# Patient Record
Sex: MALE | Race: OTHER | HISPANIC OR LATINO | URBAN - METROPOLITAN AREA
[De-identification: names, ages, dates, MRNs, and addresses within clinical notes are randomized per-mention and may not be internally consistent; named-entity substitution may affect disease eponyms.]

---

## 2023-10-02 ENCOUNTER — EMERGENCY (EMERGENCY)
Facility: HOSPITAL | Age: 63
LOS: 1 days | Discharge: HOME | End: 2023-10-02
Attending: EMERGENCY MEDICINE | Admitting: EMERGENCY MEDICINE
Payer: COMMERCIAL

## 2023-10-02 VITALS
DIASTOLIC BLOOD PRESSURE: 80 MMHG | HEART RATE: 97 BPM | RESPIRATION RATE: 16 BRPM | TEMPERATURE: 98 F | SYSTOLIC BLOOD PRESSURE: 122 MMHG | OXYGEN SATURATION: 100 %

## 2023-10-02 VITALS
SYSTOLIC BLOOD PRESSURE: 143 MMHG | TEMPERATURE: 98 F | RESPIRATION RATE: 17 BRPM | HEART RATE: 99 BPM | OXYGEN SATURATION: 100 % | DIASTOLIC BLOOD PRESSURE: 75 MMHG

## 2023-10-02 PROCEDURE — G1004: CPT

## 2023-10-02 PROCEDURE — 99284 EMERGENCY DEPT VISIT MOD MDM: CPT

## 2023-10-02 PROCEDURE — 70450 CT HEAD/BRAIN W/O DYE: CPT | Mod: 26,MG

## 2023-10-02 RX ORDER — ACETAMINOPHEN 500 MG
650 TABLET ORAL ONCE
Refills: 0 | Status: COMPLETED | OUTPATIENT
Start: 2023-10-02 | End: 2023-10-02

## 2023-10-02 RX ADMIN — Medication 650 MILLIGRAM(S): at 12:45

## 2023-10-02 NOTE — ED PROVIDER NOTE - NSDCPRINTRESULTS_ED_ALL_ED
Patient requests all Lab, Cardiology, and Radiology Results on their Discharge Instructions
Alert and oriented to person, place and time/Patient baseline mental status/Awake/Symptoms improved

## 2023-10-02 NOTE — ED ADULT NURSE NOTE - OBJECTIVE STATEMENT
pt received to wellness, aox4, pt c/o laceration to top of head, no active bleeding.  pt appear sin no acute distress.

## 2023-10-02 NOTE — ED PROVIDER NOTE - PHYSICAL EXAMINATION
Dr Bah  well appearing male sitting up, no distress  EOMI. no facial asymmetry  on the top of the scalp have a circular less than .2cm no bleeding. no swelling, dried blood cleaned.   AOx3, no focal deficits. CN 2-12 grossly intact. nl gait. no meningeal signs.

## 2023-10-02 NOTE — ED PROVIDER NOTE - HIV OFFER
"[facility]  [facility] - Family Medicine/ Internal Medicine  Clinic Note      Subjective:       Patient ID: Cruz Chapman is a 30 y.o. male.    Chief Complaint: Annual Exam    New patient is here today for annual visit.  He is at baseline health, without recent illness or fever.  He has no health concerns or questions today.  He has no medical history.  Past medical, surgical, family, social history reviewed.      Review of Systems   Constitutional: Negative for fatigue and fever.   HENT: Negative for congestion, rhinorrhea and sore throat.    Respiratory: Negative for cough and shortness of breath.    Cardiovascular: Negative for chest pain and palpitations.   Gastrointestinal: Negative for abdominal pain, diarrhea, nausea and vomiting.   Neurological: Negative for dizziness, syncope and headaches.       Objective:      /64 (BP Location: Left arm, Patient Position: Sitting, BP Method: Large (Manual))   Pulse 71   Temp 98 °F (36.7 °C) (Oral)   Ht 5' 10" (1.778 m)   Wt 83 kg (182 lb 14 oz)   SpO2 99%   BMI 26.24 kg/m²   Physical Exam   Constitutional: He is oriented to person, place, and time. He appears well-developed and well-nourished. No distress.   HENT:   Head: Normocephalic and atraumatic.   Right Ear: Tympanic membrane and ear canal normal. Tympanic membrane is not erythematous. No middle ear effusion.   Left Ear: Tympanic membrane and ear canal normal. Tympanic membrane is not erythematous.  No middle ear effusion.   Nose: Nose normal. No mucosal edema or rhinorrhea.   Mouth/Throat: Oropharynx is clear and moist and mucous membranes are normal.   Eyes: Pupils are equal, round, and reactive to light. Conjunctivae are normal.   Neck: Normal range of motion. No thyromegaly present.   Cardiovascular: Normal rate and regular rhythm.   No murmur heard.  Pulmonary/Chest: Effort normal and breath sounds normal. No respiratory distress. He has no wheezes. He has no rales.   Abdominal: Soft. " Bowel sounds are normal. He exhibits no distension. There is no tenderness. There is no guarding.   Musculoskeletal: Normal range of motion. He exhibits no edema.   Neurological: He is alert and oriented to person, place, and time. No cranial nerve deficit.   Skin: Skin is warm and dry. No rash noted.   Psychiatric: He has a normal mood and affect. His behavior is normal.   Nursing note and vitals reviewed.      Assessment:       1. Annual physical exam    2. Screening for diabetes mellitus    3. Screening for lipid disorders    4. Need for Tdap vaccination        Plan:     1. Annual physical exam  2. Screening for diabetes mellitus  3. Screening for lipid disorders  4. Need for Tdap vaccination  - health history reviewed, USPSTF preventive health recommendations based on age reviewed and tests ordered below  - - Hemoglobin A1c  - - Lipid panel  - - Comprehensive metabolic panel  - routine health maintenance counseling provided and healthy diet/exercise recommendations reviewed  - adult immunization counseling reviewed   - - (In Office Administered) Tdap Vaccine     - Follow up in about 1 year (around 2/20/2021) for annual exam.     Houston Bean MD  2/20/2020          Medication List with Changes/Refills   Current Medications    MULTIVITAMIN CAPSULE    Take 1 capsule by mouth once daily.       Opt out

## 2023-10-02 NOTE — ED ADULT TRIAGE NOTE - PATIENT ON (OXYGEN DELIVERY METHOD)
room air O-Z Flap Text: The defect edges were debeveled with a #15 scalpel blade.  Given the location of the defect, shape of the defect and the proximity to free margins an O-Z flap was deemed most appropriate.  Using a sterile surgical marker, an appropriate transposition flap was drawn incorporating the defect and placing the expected incisions within the relaxed skin tension lines where possible. The area thus outlined was incised deep to adipose tissue with a #15 scalpel blade.  The skin margins were undermined to an appropriate distance in all directions utilizing iris scissors.

## 2023-10-02 NOTE — ED PROVIDER NOTE - OBJECTIVE STATEMENT
Dr Bah  63-year-old male has no past medical history presents with laceration to the top of the head.  Patient is a  was bending down below a machine, went to stand up and hit the top of his head on the machine fell forward.  No LOC.  Occurred prior to arrival.  No nausea no vomiting no change in vision no numbness no weakness no neck pain.  Tetanus up-to-date (4 years ago). no AC

## 2023-10-02 NOTE — ED ADULT NURSE NOTE - NSFALLUNIVINTERV_ED_ALL_ED
Bed/Stretcher in lowest position, wheels locked, appropriate side rails in place/Call bell, personal items and telephone in reach/Instruct patient to call for assistance before getting out of bed/chair/stretcher/Non-slip footwear applied when patient is off stretcher/[place] to call system/Physically safe environment - no spills, clutter or unnecessary equipment/Purposeful proactive rounding/Room/bathroom lighting operational, light cord in reach

## 2023-10-02 NOTE — ED PROVIDER NOTE - PATIENT PORTAL LINK FT
You can access the FollowMyHealth Patient Portal offered by [facility] by registering at the following website: [email or web address]. By joining [email or web address]’s FollowMyHealth portal, you will also be able to view your health information using other applications (apps) compatible with our system.